# Patient Record
Sex: MALE | Race: BLACK OR AFRICAN AMERICAN | NOT HISPANIC OR LATINO | URBAN - METROPOLITAN AREA
[De-identification: names, ages, dates, MRNs, and addresses within clinical notes are randomized per-mention and may not be internally consistent; named-entity substitution may affect disease eponyms.]

---

## 2021-07-13 ENCOUNTER — EMERGENCY (EMERGENCY)
Facility: HOSPITAL | Age: 61
LOS: 1 days | Discharge: HOME | End: 2021-07-13
Attending: EMERGENCY MEDICINE
Payer: MEDICAID

## 2021-07-13 VITALS
RESPIRATION RATE: 20 BRPM | SYSTOLIC BLOOD PRESSURE: 143 MMHG | HEART RATE: 65 BPM | HEIGHT: 72 IN | OXYGEN SATURATION: 98 % | TEMPERATURE: 98 F | WEIGHT: 242.51 LBS | DIASTOLIC BLOOD PRESSURE: 87 MMHG

## 2021-07-13 PROCEDURE — 99283 EMERGENCY DEPT VISIT LOW MDM: CPT | Mod: 25

## 2021-07-13 PROCEDURE — 99284 EMERGENCY DEPT VISIT MOD MDM: CPT

## 2021-07-13 PROCEDURE — 96372 THER/PROPH/DIAG INJ SC/IM: CPT

## 2021-07-13 RX ORDER — CYCLOBENZAPRINE HYDROCHLORIDE 10 MG/1
10 TABLET, FILM COATED ORAL ONCE
Refills: 0 | Status: COMPLETED | OUTPATIENT
Start: 2021-07-13 | End: 2021-07-13

## 2021-07-13 RX ORDER — CYCLOBENZAPRINE HYDROCHLORIDE 10 MG/1
1 TABLET, FILM COATED ORAL
Qty: 9 | Refills: 0
Start: 2021-07-13 | End: 2021-07-15

## 2021-07-13 RX ORDER — ACETAMINOPHEN 500 MG
650 TABLET ORAL ONCE
Refills: 0 | Status: COMPLETED | OUTPATIENT
Start: 2021-07-13 | End: 2021-07-13

## 2021-07-13 RX ORDER — LIDOCAINE 4 G/100G
1 CREAM TOPICAL ONCE
Refills: 0 | Status: COMPLETED | OUTPATIENT
Start: 2021-07-13 | End: 2021-07-13

## 2021-07-13 RX ORDER — KETOROLAC TROMETHAMINE 30 MG/ML
60 SYRINGE (ML) INJECTION ONCE
Refills: 0 | Status: DISCONTINUED | OUTPATIENT
Start: 2021-07-13 | End: 2021-07-13

## 2021-07-13 RX ADMIN — Medication 60 MILLIGRAM(S): at 17:15

## 2021-07-13 RX ADMIN — LIDOCAINE 1 PATCH: 4 CREAM TOPICAL at 17:15

## 2021-07-13 RX ADMIN — Medication 650 MILLIGRAM(S): at 17:16

## 2021-07-13 RX ADMIN — CYCLOBENZAPRINE HYDROCHLORIDE 10 MILLIGRAM(S): 10 TABLET, FILM COATED ORAL at 17:16

## 2021-07-13 NOTE — ED PROVIDER NOTE - PHYSICAL EXAMINATION
Gen: NAD, AOx3  Head: NCAT  Lung: CTAB, no respiratory distress, no wheezing, rales, rhonchi  CV: normal s1/s2, rrr, no murmurs, Normal perfusion, pulses 2+ throughout  MSK: +TTP paraspinal muscles on left, No edema, no visible deformities, full range of motion in all 4 extremities  Neuro: CN II-XII grossly intact, No focal neurologic deficits, strength 5/5 throughout, sensation intact throughout  Skin: No rash   Psych: normal affect

## 2021-07-13 NOTE — ED PROVIDER NOTE - CARE PROVIDER_API CALL
Micky Srivastava (DO)  Orthopaedic Surgery  [address] [place] [address], [address]  [place] [address]  Phone: [phone]  Fax: [phone]  Follow Up Time: 1-3 Days

## 2021-07-13 NOTE — ED PROVIDER NOTE - PATIENT PORTAL LINK FT
You can access the FollowMyHealth Patient Portal offered by [facility] by registering at the following website: [email or web address]. By joining [facility]’s FollowMyHealth portal, you will also be able to view your health information using other applications (apps) compatible with our system.

## 2021-07-13 NOTE — ED PROVIDER NOTE - NSFOLLOWUPINSTRUCTIONS_ED_ALL_ED_FT
1. Follow up with your primary care physician within 2-3days for reevaluation.  2.  Return to the Emergency Department for worsening, progressive or any other concerning symptoms.   3.  Please take Motrin 600mg by mouth every 6 hours as needed for pain. Please take this medication with food.   4.  Please take tylenol 650 mg every 4 hours as needed for pain. Please do not exceed more than 4,000mg of Tylenol in a day       Lumbar Radiculopathy    WHAT YOU NEED TO KNOW:    Lumbar radiculopathy is a painful condition that happens when a nerve in your lumbar spine (lower back) is pinched or irritated. Nerves control feeling and movement in your body. You may have numbness or pain that shoots down from your lower back towards your foot.    DISCHARGE INSTRUCTIONS:    Medicines:   •Medicines:?NSAIDs, such as ibuprofen, help decrease swelling, pain, and fever. This medicine is available with or without a doctor's order. NSAIDs can cause stomach bleeding or kidney problems in certain people. If you take blood thinner medicine, always ask your healthcare provider if NSAIDs are safe for you. Always read the medicine label and follow directions.      ?Muscle relaxers help decrease pain and muscle spasms.      ?Opioids: This is a strong medicine given to reduce severe pain. It is also called narcotic pain medicine. Take this medicine exactly as directed by your healthcare provider.      ?Oral steroids: Steroids may also be given to reduce pain and swelling.      ?Take your medicine as directed. Contact your healthcare provider if you think your medicine is not helping or if you have side effects. Tell him of her if you are allergic to any medicine. Keep a list of the medicines, vitamins, and herbs you take. Include the amounts, and when and why you take them. Bring the list or the pill bottles to follow-up visits. Carry your medicine list with you in case of an emergency.          Follow up with your healthcare provider or spine specialist within 1 to 3 weeks: After your first follow-up appointment, return to your healthcare provider or spine specialist every 2 weeks until you have healed. Ask for information about physical therapy for your condition. Write down your questions so you remember to ask them during your visits.    Physical therapy: You may need physical therapy to improve your condition. Your physical therapist may teach you certain exercises to improve posture (the way you stand and sit), flexibility, and strength in your lower back.     Self care:   •Stay active: It is best to be active when you have lumbar radiculopathy. Your physical therapist or healthcare provider may tell you to take walks to ease yourself back into your daily routine. Avoid long periods of bed rest. Bed rest could worsen your symptoms. Do not move in ways that increase your pain. Ask for more information about the best ways to stay active.       •Use ice or heat packs: Use ice or heat packs as directed on the sore area of your body to decrease the pain and swelling. Put ice in a plastic bag covered with a towel on your low back. Cover heated items with a towel to avoid burns. Use ice and heat as directed.      •Avoid heavy lifting: Your condition may worsen if you lift heavy things. Avoid lifting if possible.      •Maintain a healthy weight: Excess body weight may strain your back. Talk with your healthcare provider about ways to lose excess weight if you are overweight.       Contact your healthcare provider or spine specialist if:   •Your pain does not improve within 1 to 3 weeks after treatment.      •Your pain and weakness keep you from your normal activities at work, home, or school.      •You lose more than 10 pounds in 6 months without trying.      •You become depressed or sad because of the pain.      •You have questions or concerns about your condition or care.      Return to the emergency department if:   •You have a fever greater than 100.4°F for longer than 2 days.      •You have new, severe back or leg pain, or your pain spreads to both legs.      •You have any new signs of numbness or weakness, especially in your lower back, legs, arms, or genital area.      •You have new trouble controlling your urine and bowel movements.      •You do not feel like your bladder empties when you urinate.

## 2021-07-13 NOTE — ED ADULT TRIAGE NOTE - CHIEF COMPLAINT QUOTE
Pt arrives to ED c/o non traumatic  Lower back pain radiating  to the L hip. Pain not relieved with OT medications

## 2021-07-13 NOTE — ED PROVIDER NOTE - CLINICAL SUMMARY MEDICAL DECISION MAKING FREE TEXT BOX
Pt with lumbar radiculopathy, no red flags to suggest cord compression/fx/epidural abscess/hematoma, tx pain, dc home with spine f/u. Nina Edgar DO

## 2021-08-02 DIAGNOSIS — M54.5 LOW BACK PAIN: ICD-10-CM

## 2021-08-04 ENCOUNTER — APPOINTMENT (OUTPATIENT)
Dept: ORTHOPEDIC SURGERY | Facility: CLINIC | Age: 61
End: 2021-08-04

## 2021-08-12 ENCOUNTER — EMERGENCY (EMERGENCY)
Facility: HOSPITAL | Age: 61
LOS: 1 days | Discharge: HOME | End: 2021-08-12
Attending: EMERGENCY MEDICINE
Payer: MEDICAID

## 2021-08-12 VITALS
HEIGHT: 72 IN | RESPIRATION RATE: 15 BRPM | TEMPERATURE: 98 F | HEART RATE: 66 BPM | WEIGHT: 248.02 LBS | DIASTOLIC BLOOD PRESSURE: 82 MMHG | SYSTOLIC BLOOD PRESSURE: 122 MMHG | OXYGEN SATURATION: 97 %

## 2021-08-12 LAB
ALBUMIN SERPL ELPH-MCNC: 4.4 G/DL — SIGNIFICANT CHANGE UP (ref 3.3–5.2)
ALP SERPL-CCNC: 84 U/L — SIGNIFICANT CHANGE UP (ref 40–120)
ALT FLD-CCNC: 14 U/L — SIGNIFICANT CHANGE UP
ANION GAP SERPL CALC-SCNC: 10 MMOL/L — SIGNIFICANT CHANGE UP (ref 5–17)
APAP SERPL-MCNC: <3 UG/ML — LOW (ref 10–26)
AST SERPL-CCNC: 20 U/L — SIGNIFICANT CHANGE UP
BILIRUB SERPL-MCNC: 0.5 MG/DL — SIGNIFICANT CHANGE UP (ref 0.4–2)
BUN SERPL-MCNC: 12 MG/DL — SIGNIFICANT CHANGE UP (ref 8–20)
CALCIUM SERPL-MCNC: 9.6 MG/DL — SIGNIFICANT CHANGE UP (ref 8.6–10.2)
CHLORIDE SERPL-SCNC: 107 MMOL/L — SIGNIFICANT CHANGE UP (ref 98–107)
CO2 SERPL-SCNC: 25 MMOL/L — SIGNIFICANT CHANGE UP (ref 22–29)
CREAT SERPL-MCNC: 1.15 MG/DL — SIGNIFICANT CHANGE UP (ref 0.5–1.3)
GLUCOSE SERPL-MCNC: 92 MG/DL — SIGNIFICANT CHANGE UP (ref 70–99)
HCT VFR BLD CALC: 41.8 % — SIGNIFICANT CHANGE UP (ref 39–50)
HGB BLD-MCNC: 13.3 G/DL — SIGNIFICANT CHANGE UP (ref 13–17)
MAGNESIUM SERPL-MCNC: 2 MG/DL — SIGNIFICANT CHANGE UP (ref 1.6–2.6)
MCHC RBC-ENTMCNC: 26.2 PG — LOW (ref 27–34)
MCHC RBC-ENTMCNC: 31.8 GM/DL — LOW (ref 32–36)
MCV RBC AUTO: 82.4 FL — SIGNIFICANT CHANGE UP (ref 80–100)
PLATELET # BLD AUTO: 302 K/UL — SIGNIFICANT CHANGE UP (ref 150–400)
POTASSIUM SERPL-MCNC: 4.6 MMOL/L — SIGNIFICANT CHANGE UP (ref 3.5–5.3)
POTASSIUM SERPL-SCNC: 4.6 MMOL/L — SIGNIFICANT CHANGE UP (ref 3.5–5.3)
PROT SERPL-MCNC: 7.3 G/DL — SIGNIFICANT CHANGE UP (ref 6.6–8.7)
RBC # BLD: 5.07 M/UL — SIGNIFICANT CHANGE UP (ref 4.2–5.8)
RBC # FLD: 14.5 % — SIGNIFICANT CHANGE UP (ref 10.3–14.5)
SODIUM SERPL-SCNC: 142 MMOL/L — SIGNIFICANT CHANGE UP (ref 135–145)
TROPONIN T SERPL-MCNC: <0.01 NG/ML — SIGNIFICANT CHANGE UP (ref 0–0.06)
WBC # BLD: 8.73 K/UL — SIGNIFICANT CHANGE UP (ref 3.8–10.5)
WBC # FLD AUTO: 8.73 K/UL — SIGNIFICANT CHANGE UP (ref 3.8–10.5)

## 2021-08-12 PROCEDURE — 80307 DRUG TEST PRSMV CHEM ANLYZR: CPT

## 2021-08-12 PROCEDURE — 99283 EMERGENCY DEPT VISIT LOW MDM: CPT

## 2021-08-12 PROCEDURE — 85027 COMPLETE CBC AUTOMATED: CPT

## 2021-08-12 PROCEDURE — 93010 ELECTROCARDIOGRAM REPORT: CPT

## 2021-08-12 PROCEDURE — 80053 COMPREHEN METABOLIC PANEL: CPT

## 2021-08-12 PROCEDURE — 36415 COLL VENOUS BLD VENIPUNCTURE: CPT

## 2021-08-12 PROCEDURE — 93005 ELECTROCARDIOGRAM TRACING: CPT

## 2021-08-12 PROCEDURE — 99285 EMERGENCY DEPT VISIT HI MDM: CPT

## 2021-08-12 PROCEDURE — 82962 GLUCOSE BLOOD TEST: CPT

## 2021-08-12 PROCEDURE — 83735 ASSAY OF MAGNESIUM: CPT

## 2021-08-12 PROCEDURE — 84484 ASSAY OF TROPONIN QUANT: CPT

## 2021-08-12 NOTE — ED PROVIDER NOTE - NSFOLLOWUPINSTRUCTIONS_ED_ALL_ED_FT
1. Return to ED for worsening, progressive or any other concerning symptoms   2. Follow up with your primary care doctor in 2-3days   3. Take motrin 400mg every 6 hours as needed for pain and Take Tylenol up to 1000 mg every 6 hours as needed for pain. DO NOT TAKE MORE THAN 4000MG OF TYLENOL IN 24 HOURS   4. [facility]   5. Follow up with your cardiologist for your abnormal EKG     Syncope    Syncope is when you temporarily lose consciousness, also called fainting or passing out. It is caused by a sudden decrease in blood flow to the brain. Even though most causes of syncope are not dangerous, syncope can possibly be a sign of a serious medical problem. Signs that you may be about to faint include feeling dizzy, lightheaded, nausea, visual changes, or cold/clammy skin. Do not drive, operate heavy machinery, or play sports until your health care provider says it is okay.    SEEK IMMEDIATE MEDICAL CARE IF YOU HAVE ANY OF THE FOLLOWING SYMPTOMS: severe headache, pain in your chest/abdomen/back, bleeding from your mouth or rectum, palpitations, shortness of breath, pain with breathing, seizure, confusion, or trouble walking.

## 2021-08-12 NOTE — ED ADULT NURSE NOTE - CHIEF COMPLAINT QUOTE
pt arrives by ambulance with reports of having a tooth ache and maybe taking too much medications, reports taking a bottle to tylenol yesterday, didn't sleep all night, and [facility] and become drowsy on a bus nodding in and out and the ambulance was called. pt a&ox3 at this time. denies SI.

## 2021-08-12 NOTE — ED PROVIDER NOTE - OBJECTIVE STATEMENT
62[facility] M [facility], given injection for pain and abx, was sitting waiting for bus in the sun ~ 1hr, didn't sleep last night due to pain in tooth. denies any CP/SOB. states he just fell asleep. feels fine now except for tooth pain, does admit that for last 24hours took an entire bottle of extra strength tylenol. no fever/chills. no focal weknaess. no HA

## 2021-08-12 NOTE — ED ADULT TRIAGE NOTE - CHIEF COMPLAINT QUOTE
pt arrives by ambulance with reports of having a tooth ache and maybe taking too much medications causing him to become drowsy on a bus nodding in and out and the ambulance was called. pt a&ox3 at this time. denies SI. pt arrives by ambulance with reports of having a tooth ache and maybe taking too much medications, reports taking a bottle to tylenol yesterday, didn't sleep all night, and today [facility] and become drowsy on a bus nodding in and out and the ambulance was called. pt a&ox3 at this time. denies SI.

## 2021-08-12 NOTE — ED ADULT NURSE NOTE - OBJECTIVE STATEMENT
61 yom with tooth ache took a lot of tylenol these past couple of days, denies n/v/d denies pain in abdomen. afebrle. pt awake alert oriented x 3 denies si denies hi. no auditory hallucination no bleeding from rectum.

## 2021-08-12 NOTE — ED PROVIDER NOTE - CLINICAL SUMMARY MEDICAL DECISION MAKING FREE TEXT BOX
sounds like patient fell asleep on bench, denies any chest pain or shortness of breath, ekg with t wvae inversions but last ekg in system 2012 which was normal at that time. will send trop. concern for ingetsiton of tylenol. will check level

## 2021-08-12 NOTE — ED PROVIDER NOTE - PATIENT PORTAL LINK FT
You can access the FollowMyHealth Patient Portal offered by [facility] by registering at the following website: [email or web address]. By joining [facility]’s FollowMyHealth portal, you will also be able to view your health information using other applications (apps) compatible with our system.

## 2023-02-08 NOTE — ED PROVIDER NOTE - OBJECTIVE STATEMENT
62yo male no PMH presenting with low back pain x 1 week that started when mowing grass. Patient states that pain radiates to. Tried Tylenol, Naproxen, heat without relief. No numbness/tingling/weakness. No urinary incontinence/urinary retention, no saddle anesthesia. No IVDA. No fevers/chills.
show
WDL